# Patient Record
Sex: MALE | Race: WHITE | NOT HISPANIC OR LATINO | Employment: OTHER | URBAN - METROPOLITAN AREA
[De-identification: names, ages, dates, MRNs, and addresses within clinical notes are randomized per-mention and may not be internally consistent; named-entity substitution may affect disease eponyms.]

---

## 2023-10-20 ENCOUNTER — LAB (OUTPATIENT)
Dept: LAB | Facility: LAB | Age: 88
End: 2023-10-20
Payer: MEDICARE

## 2023-10-20 DIAGNOSIS — N28.9 DISORDER OF KIDNEY AND URETER, UNSPECIFIED: Primary | ICD-10-CM

## 2023-10-20 DIAGNOSIS — N28.9 DISORDER OF KIDNEY AND URETER, UNSPECIFIED: ICD-10-CM

## 2023-10-20 LAB
BUN SERPL-MCNC: 26 MG/DL (ref 6–23)
CREAT SERPL-MCNC: 1.65 MG/DL (ref 0.5–1.3)
GFR SERPL CREATININE-BSD FRML MDRD: 39 ML/MIN/1.73M*2

## 2023-10-20 PROCEDURE — 82565 ASSAY OF CREATININE: CPT

## 2023-10-20 PROCEDURE — 84520 ASSAY OF UREA NITROGEN: CPT

## 2023-10-20 PROCEDURE — 36415 COLL VENOUS BLD VENIPUNCTURE: CPT

## 2023-11-10 PROBLEM — I38 VALVULAR HEART DISEASE: Status: ACTIVE | Noted: 2023-11-10

## 2023-11-10 PROBLEM — N45.3 EPIDIDYMOORCHITIS: Status: ACTIVE | Noted: 2023-11-10

## 2023-11-10 PROBLEM — I10 ESSENTIAL HYPERTENSION: Status: ACTIVE | Noted: 2023-11-10

## 2023-11-10 PROBLEM — H61.21 EXCESSIVE CERUMEN IN RIGHT EAR CANAL: Status: ACTIVE | Noted: 2023-11-10

## 2023-11-10 PROBLEM — N40.0 BPH (BENIGN PROSTATIC HYPERPLASIA): Status: ACTIVE | Noted: 2023-11-10

## 2023-11-10 PROBLEM — B00.9 HERPES SIMPLEX VIRUS INFECTION: Status: ACTIVE | Noted: 2023-11-10

## 2023-11-10 PROBLEM — H35.30 MACULAR DEGENERATION: Status: ACTIVE | Noted: 2023-11-10

## 2023-11-10 PROBLEM — I36.1 NONRHEUMATIC TRICUSPID VALVE REGURGITATION: Status: ACTIVE | Noted: 2021-03-10

## 2023-11-10 PROBLEM — I34.0 NONRHEUMATIC MITRAL VALVE REGURGITATION: Status: ACTIVE | Noted: 2021-03-10

## 2023-11-10 PROBLEM — N50.3 EPIDIDYMAL CYST: Status: ACTIVE | Noted: 2023-11-10

## 2023-11-10 PROBLEM — R73.01 IMPAIRED FASTING GLUCOSE: Status: ACTIVE | Noted: 2018-11-21

## 2023-11-10 PROBLEM — R51.9 CHRONIC DAILY HEADACHE: Status: ACTIVE | Noted: 2023-11-10

## 2023-11-10 PROBLEM — J31.0 CHRONIC RHINITIS: Status: ACTIVE | Noted: 2023-11-10

## 2023-11-10 PROBLEM — G47.33 OSA (OBSTRUCTIVE SLEEP APNEA): Status: ACTIVE | Noted: 2023-11-10

## 2023-11-10 PROBLEM — I61.9: Status: ACTIVE | Noted: 2023-11-10

## 2023-11-10 PROBLEM — I11.9 HYPERTENSIVE LEFT VENTRICULAR HYPERTROPHY: Status: ACTIVE | Noted: 2023-11-10

## 2023-11-10 PROBLEM — N52.9 ERECTILE DYSFUNCTION: Status: ACTIVE | Noted: 2023-11-10

## 2023-11-10 PROBLEM — R97.20 ELEVATED PSA: Status: ACTIVE | Noted: 2023-11-10

## 2023-11-10 PROBLEM — R91.8 MULTIPLE PULMONARY NODULES DETERMINED BY COMPUTED TOMOGRAPHY OF LUNG: Status: ACTIVE | Noted: 2021-04-24

## 2023-11-10 PROBLEM — I87.8 VENOUS STASIS OF BOTH LOWER EXTREMITIES: Status: ACTIVE | Noted: 2019-05-31

## 2023-11-10 PROBLEM — J32.9 CHRONIC SINUSITIS: Status: ACTIVE | Noted: 2023-11-10

## 2023-11-10 PROBLEM — H90.3 ASYMMETRICAL SENSORINEURAL HEARING LOSS: Status: ACTIVE | Noted: 2023-11-10

## 2023-11-10 PROBLEM — I71.20 THORACIC AORTIC ANEURYSM WITHOUT RUPTURE (CMS-HCC): Status: ACTIVE | Noted: 2021-03-10

## 2023-11-10 PROBLEM — Z86.718 HISTORY OF DVT OF LOWER EXTREMITY: Status: ACTIVE | Noted: 2017-12-05

## 2023-11-10 PROBLEM — E78.5 HYPERLIPIDEMIA: Status: ACTIVE | Noted: 2023-11-10

## 2023-11-10 RX ORDER — HYDROCHLOROTHIAZIDE 25 MG/1
25 TABLET ORAL DAILY
COMMUNITY

## 2023-11-10 RX ORDER — MAG HYDROX/ALUMINUM HYD/SIMETH 400-400-40
450 SUSPENSION, ORAL (FINAL DOSE FORM) ORAL
COMMUNITY

## 2023-11-10 RX ORDER — SILDENAFIL 100 MG/1
100 TABLET, FILM COATED ORAL
COMMUNITY
Start: 2017-06-06 | End: 2023-11-13

## 2023-11-10 RX ORDER — POTASSIUM CHLORIDE 750 MG/1
10 TABLET, FILM COATED, EXTENDED RELEASE ORAL DAILY
COMMUNITY
Start: 2021-07-21

## 2023-11-10 RX ORDER — RAMIPRIL 10 MG/1
10 CAPSULE ORAL DAILY
COMMUNITY

## 2023-11-10 RX ORDER — DONEPEZIL HYDROCHLORIDE 10 MG/1
10 TABLET, FILM COATED ORAL NIGHTLY
COMMUNITY
Start: 2023-03-16 | End: 2023-11-13

## 2023-11-10 RX ORDER — LIDOCAINE 50 MG/G
1 PATCH TOPICAL DAILY
COMMUNITY
End: 2023-11-13

## 2023-11-10 RX ORDER — ATORVASTATIN CALCIUM 20 MG/1
20 TABLET, FILM COATED ORAL DAILY
COMMUNITY
Start: 2021-07-21

## 2023-11-10 RX ORDER — TADALAFIL 20 MG/1
TABLET ORAL
COMMUNITY
Start: 2021-09-27 | End: 2023-11-13

## 2023-11-10 RX ORDER — FLUOCINONIDE 0.5 MG/G
OINTMENT TOPICAL
COMMUNITY
Start: 2023-07-31 | End: 2023-11-13

## 2023-11-10 RX ORDER — AMLODIPINE BESYLATE 5 MG/1
5 TABLET ORAL DAILY
COMMUNITY
Start: 2023-08-16 | End: 2023-11-13

## 2023-11-10 RX ORDER — MELOXICAM 15 MG/1
1 TABLET ORAL DAILY
COMMUNITY
Start: 2017-03-02 | End: 2023-11-13

## 2023-11-10 RX ORDER — TAMSULOSIN HYDROCHLORIDE 0.4 MG/1
0.4 CAPSULE ORAL DAILY
COMMUNITY
Start: 2022-10-28 | End: 2024-05-16

## 2023-11-10 RX ORDER — TADALAFIL 10 MG/1
10 TABLET ORAL DAILY PRN
COMMUNITY
Start: 2022-09-01 | End: 2023-11-13

## 2023-11-10 RX ORDER — VITAMIN B COMPLEX
TABLET ORAL
COMMUNITY
End: 2023-11-13

## 2023-11-10 RX ORDER — FUROSEMIDE 40 MG/1
40 TABLET ORAL EVERY OTHER DAY
COMMUNITY
Start: 2023-08-16

## 2023-11-10 RX ORDER — AMLODIPINE BESYLATE 10 MG/1
5 TABLET ORAL DAILY
COMMUNITY
End: 2023-11-13

## 2023-11-10 RX ORDER — ASPIRIN 81 MG/1
81 TABLET ORAL
COMMUNITY

## 2023-11-10 RX ORDER — ICOSAPENT ETHYL 1000 MG/1
2 CAPSULE ORAL 2 TIMES DAILY
COMMUNITY
Start: 2023-08-25

## 2023-11-10 RX ORDER — DONEPEZIL HYDROCHLORIDE 5 MG/1
1 TABLET, FILM COATED ORAL 2 TIMES DAILY
COMMUNITY
Start: 2020-05-28 | End: 2023-11-13

## 2023-11-10 RX ORDER — MEMANTINE HYDROCHLORIDE 28 MG/1
28 CAPSULE, EXTENDED RELEASE ORAL DAILY
COMMUNITY

## 2023-11-10 RX ORDER — CALCIUM CARBONATE 300MG(750)
1 TABLET,CHEWABLE ORAL DAILY
COMMUNITY

## 2023-11-10 RX ORDER — POTASSIUM CHLORIDE 1500 MG/1
20 TABLET, EXTENDED RELEASE ORAL 2 TIMES DAILY
COMMUNITY
Start: 2023-08-23 | End: 2023-11-13

## 2023-11-13 ENCOUNTER — OFFICE VISIT (OUTPATIENT)
Dept: UROLOGY | Facility: CLINIC | Age: 88
End: 2023-11-13
Payer: MEDICARE

## 2023-11-13 DIAGNOSIS — N28.1 RENAL CYST: ICD-10-CM

## 2023-11-13 DIAGNOSIS — N40.0 BENIGN PROSTATIC HYPERPLASIA, UNSPECIFIED WHETHER LOWER URINARY TRACT SYMPTOMS PRESENT: ICD-10-CM

## 2023-11-13 DIAGNOSIS — R97.20 ELEVATED PSA: Primary | ICD-10-CM

## 2023-11-13 LAB
POC BILIRUBIN, URINE: NEGATIVE
POC BLOOD, URINE: NEGATIVE
POC GLUCOSE, URINE: NEGATIVE MG/DL
POC KETONES, URINE: NEGATIVE MG/DL
POC LEUKOCYTES, URINE: NEGATIVE
POC NITRITE,URINE: NEGATIVE
POC PH, URINE: 7 PH
POC PROTEIN, URINE: NEGATIVE MG/DL
POC SPECIFIC GRAVITY, URINE: 1.01
POC UROBILINOGEN, URINE: 0.2 EU/DL

## 2023-11-13 PROCEDURE — 99213 OFFICE O/P EST LOW 20 MIN: CPT | Performed by: UROLOGY

## 2023-11-13 PROCEDURE — 3075F SYST BP GE 130 - 139MM HG: CPT | Performed by: UROLOGY

## 2023-11-13 PROCEDURE — 81003 URINALYSIS AUTO W/O SCOPE: CPT | Performed by: UROLOGY

## 2023-11-13 PROCEDURE — 1159F MED LIST DOCD IN RCRD: CPT | Performed by: UROLOGY

## 2023-11-13 PROCEDURE — 3080F DIAST BP >= 90 MM HG: CPT | Performed by: UROLOGY

## 2023-11-13 NOTE — PROGRESS NOTES
11/13/2023   voiding well, no complain.    Patient has no nausea, no vomiting, no fever.    PAVEL: No more    PSA: No more    We discussed left scrotal enlargement, scrotal ultrasound  We discussed CT scan results, unremarkable   all the questions were answered, the patient expressed understanding and agreed to the plan.     Impression  Left scrotal enlargement  Left ureteral lesion  Renal cyst  Elevated PSA stable  BPH  ED     Plan  Renal ultrasound in 1 year for renal cyst, ureteral lesion follow-up  No more PSA check  Appointment in 1 year    Chief Complaint   Patient presents with    Benign Prostatic Hypertrophy     Voiding well.     Groin Swelling    Left Ureteral Lesion        Physical Exam     TODAYS LAB RESULTS:    No Scrotal US in records    CT Abdomen and Pelvis 08/29/2023  IMPRESSION:     1. No acute fracture or traumatic malalignment of the thoracolumbar   spine.   2. No pleural effusion, consolidation or acute findings in the abdomen   or pelvis.     3.  New 1.3 cm area of confluent nodularities in the superior left lower   lobe, might represent focal inflammation however follow-up in one to 3   months is recommended to assess for resolution.  Other lung nodules are   stable.     4. Ascending stable fusiform dilation of the ascending aorta measuring   up to 4.4 cm with ectatic descending aorta as noted on 2021.   5.  Significantly enlarged prostate.  Bladder wall trabeculation and mild   bilateral urothelial thickening, reflecting associated chronic   obstructive changes.   Other chronic findings, as above.     POC Glucose, Urine  NEGATIVE mg/dl NEGATIVE   POC Bilirubin, Urine  NEGATIVE NEGATIVE   POC Ketones, Urine  NEGATIVE mg/dl NEGATIVE   POC Specific Gravity, Urine  1.005 - 1.035 1.015   POC Blood, Urine  NEGATIVE NEGATIVE   POC PH, Urine  No Reference Range Established PH 7.0   POC Protein, Urine  NEGATIVE, 30 (1+) mg/dl NEGATIVE   POC Urobilinogen, Urine  0.2, 1.0 EU/DL 0.2   Poc Nitrite,  Urine  NEGATIVE NEGATIVE   POC Leukocytes, Urine  NEGATIVE NEGATIVE       ASSESSMENT&PLAN:      IMPRESSIONS:     06/05/2023  Complaining left scrotal enlargement, no pain     Patient has no nausea, no vomiting, no fever.     Exam: Left scrotum twice and large, soft no tenderness     Patient here today for concerns of swollen testicle. He was last seen 5/11/23 for left ureteral lesion, elevated PSA, BPH. He was to follow up in 6 months with BUN/Cr, CT abd/pelvis prior.      Patient has noticed some swelling in left testicle ongoing for a few years, but over the past few weeks it has gotten bigger. He fell on 5/3/23 and since then he noticed some swelling in leg as well as left testicle. He is not having any pain or discomfort at all.      -JMorgenstern CMA        IO Glucose - Urine Negative REQUIRED    IO Bilirubin Negative REQUIRED    IO Ketones Negative REQUIRED    IO Specific Gravity 1.015 REQUIRED    IO Blood Negative REQUIRED    IO pH 7.0 REQUIRED    IO Protein, Urine Negative REQUIRED    IO Urobilinogen Normal (0.2-1.0 mg/dl) REQUIRED    IO Nitrite, Urine Negative REQUIRED    IO Leukocytes Negative      We discussed left scrotal enlargement, scrotal ultrasound  We discussed continue CT scan in the follow-up appointment  All the questions were answered, the patient expressed understanding and agreed to the plan.     Impression  Left scrotal enlargement  Left ureteral lesion  Elevated PSAâ€“stable  BPH  ED     Plan  Scrotal ultrasound for left scrotal enlargement  Keep current CT scan and appointment     I spent 25 minutes with this patient. Greater than 50% of this time was spent in counseling and/or coordination of care        05/11/2023  Patient here for incidental finding of left ureteral lesion on the CT scan outside hospital     Patient has no nausea, no vomiting, no fever.     Patient here today for consult per Dr. Gonsales for concerns of mass on ureter noted on CT done 5/3/23- printed for review. Patient  had CT done while inpatient at [facility] due to syncope, bradycardia, edema of right leg.      CT abdomen and pelvis: Questionable soft tissue density along the left ureter. Cannot excludeed urothelial cancer.     Patient was last seen in office 11/10/22 for elevated PSA, BPH. He was to continue the Flomax 0.4mg daily- he is take one daily and will need a refill of this. Patient denies dysuria, burning, hematuria, frequency/urgency. Nocturia x1. Overall states he is doing well with no urinary issues.   PSA 3/18/22 7.84  -JMorgenstern CMA     IO Glucose - Urine Negative REQUIRED    IO Bilirubin Negative REQUIRED    IO Ketones Negative REQUIRED    IO Specific Gravity 1.010 REQUIRED    IO Blood Trace REQUIRED    IO pH 7.0 REQUIRED    IO Protein, Urine Negative REQUIRED    IO Urobilinogen Normal (0.2-1.0 mg/dl) REQUIRED    IO Nitrite, Urine Negative REQUIRED    IO Leukocytes Negative      We discussed the CT report, left ureteral lesion, repeat imaging study in 6 months  All the questions were answered, the patient expressed understanding and agreed to the plan.     Impression  Left ureteral lesion  Elevated PSAâ€“stable  BPH  ED     Plan  CT abdomen and pelvis with and without contrast in 6 months for left ureteral lesion follow-up  BUN/creatinine in 6 months  Continue Flomax 0.4 mg daily  Appointment in 6 months     I spent 25 minutes with this patient. Greater than 50% of this time was spent in counseling and/or coordination of care        11/10/2022  Not tolerate Flomax 2 a day, switch to 0.4 mg daily     Patient has no nausea, no vomiting, no fever.     PAVEL: Deferred     PSA stable 7.84     Patient here for 3 month follow up for elevated PSA, ED, BPH  Last PSA done 03/18/22 7.84  Patient see  last was prescribed tadalafil --Mara  Patient denied urgency, nocturia, dysuria, hematuria. He does have some frequency. He does feel that he empties all the way. He went to ER from passing out and the ER said  it was from the flomax. He states that his blood pressure was really low. He is now taking one a a day. He does not think he is taking tadalafil.     We discussed left scrotal enlargement, conservative management   We discussed benign prostate hypertrophy with a mild voiding symptoms on Flomax  We discussed erectile dysfunction, failed medications, penile injection and prosthesis etc, Cialis 5mg trial   We discussed elevated PSA but stable  All the questions were answered, the patient expressed understanding and agreed to the plan.     Impression  Elevated PSAâ€“stable  BPH  ED     Plan  Continue Flomax 0.4 mg daily  Yearly PAVEL and PSA        9/1/2022 HBM  Impression: Erectile dysfunction     History of elevated PSA with negative transrectal ultrasound and biopsy in the past     Plan: Try Cialis the risks benefits alternatives and complications of this drug was described to the patient in detail     A prescription for Cialis 10 mg was called into his local pharmacy he is to call us within the next few weeks if this drug is successful we will call in a long-term prescription for him     Repeat PSA in April 2023     Return to the office in 3 months for follow-up.            9/27/21:   Patient here today for year check of BPH, elevated PSA, ED. Patient states he noticed a lump in left testicle that seems to be getting larger. He is also still concerned about ED.      Nocturia 2-3x.      Patient takes Flomax 0.8mg daily.     Exam: Not circumcised, penis normal, left side testicle 2x bigger     PSA 5/12/21 8.62 - Slight decrease      IO UA (automated w/o microscopy)           [id number] 03:41PM          Gabriel Del Rio     Test Name       Result     Flag        Reference  IO Glucose - Urine         Negative                  IO Bilirubin       Negative                  IO Ketones      Trace                       IO Specific Gravity         1.030                       IO Blood          Negative                  IO pH                5.0                           IO Protein, Urine            Negative                  IO Urobilinogen                                              Normal (0.2-1.0 mg/dl)  IO Nitrite, Urine              Negative                  IO Leukocytes Negative                  IO Glucose - Urine         Negative                  IO Bilirubin       Negative                  IO Ketones      Trace                       IO Specific Gravity         1.030                       IO Blood          Negative                  IO pH               5.0                           IO Protein, Urine            Negative                  IO Urobilinogen                                              Normal (0.2-1.0 mg/dl)  IO Nitrite, Urine              Negative                  IO Leukocytes Negative                                                            We discussed left scrotal enlargement, conservative management   We discussed benign prostate hypertrophy with a mild voiding symptoms on Flomax  We discussed erectile dysfunction, failed medications, penile injection and prosthesis etc, Cialis 5mg trial   We discussed elevated PSA but stable  All the questions were answered, the patient expressed understanding and agreed to the plan.     Impression  Elevated PSAâ€“stable  BPH  ED     Plan  Cialis 5mg as needed x5, trial 2x refill  Continue Flomax 0.8 mg daily  Yearly PAVEL and PSA     I spent 25 minutes with the patient. Greater than 50% of this time was spent in counselling and/or coordination of care.      11/20/2020  Voiding well on Flomax 0.4 mg daily, no weight loss no bone pain     Patient has no nausea, no vomiting, no fever.     PAVEL: Deferred     PSA 5/12/20 9.50.â€“Stable     IO UA (automated w/o microscopy)           [id number] 09:09AM          Gabriel Del Rio     Test Name       Result     Flag        Reference  IO Glucose - Urine         Negative                  IO Bilirubin       Negative                  IO Ketones      Negative                   IO Specific Gravity         1.025                       IO Blood          Negative                  IO pH               6.0                           IO Protein, Urine            Negative                  IO Urobilinogen                                              Normal (0.2-1.0 mg/dl)  IO Nitrite, Urine              Negative                  IO Leukocytes Negative                  IO Glucose - Urine         Negative                  IO Bilirubin       Negative                  IO Ketones      Negative                  IO Specific Gravity         1.025                       IO Blood          Negative                  IO pH               6.0                           IO Protein, Urine            Negative                  IO Urobilinogen                                              Normal (0.2-1.0 mg/dl)  IO Nitrite, Urine              Negative                  IO Leukocytes Negative                                                            We discussed benign prostate hypertrophy with a mild voiding symptoms on Flomax  We discussed erectile dysfunction, failed medications, penile injection and prosthesis etc.  We discussed elevated PSA but stable  All the questions were answered, the patient expressed understanding and agreed to the plan.     Impression  Elevated PSAâ€“stable  BPH  ED     Plan  Continue Flomax 0.4 mg daily  Yearly PAVEL and PSA              11/15/2019  Voiding well, nocturia Ã--1, on Flomax.; Complaining ED, Viagra does not work      Patient has no nausea, no vomiting, no fever.     PAVEL: 1+, no nodule     PSA 5/6/19 8.69     Test Name       Result     Flag        Reference  IO Glucose - Urine         Negative                Normal  IO Bilirubin       Negative                Negative  IO Ketones      Negative                Negative  IO Specific Gravity         1.015                     1.000-1.030  IO Blood          Negative                Negative  IO pH               6.5                          5.0-8.0  IO Protein, Urine            Negative                Negative  IO Urobilinogen                                            Normal  Normal (0.2-1.0 mg/dl)  IO Nitrite, Urine              Negative                Negative  IO Leukocytes Negative                Negative  IO Glucose - Urine         Negative                Normal  IO Bilirubin       Negative                Negative  IO Ketones      Negative                Negative  IO Specific Gravity         1.015                     1.000-1.030  IO Blood          Negative                Negative  IO pH               6.5                         5.0-8.0  IO Protein, Urine            Negative                Negative  IO Urobilinogen                                            Normal  Normal (0.2-1.0 mg/dl)  IO Nitrite, Urine              Negative                Negative  IO Leukocytes Negative                Negative     We discussed benign prostate hypertrophy with a mild voiding symptoms on Flomax  We discussed erectile dysfunction, failed medications, penile injection and prosthesis etc.  We discussed elevated PSA but stable  All the questions were answered, the patient expressed understanding and agreed to the plan.     Impression  Elevated PSAâ€“stable  BPH  ED     Plan  Continue Flomax 0.4 mg daily  Yearly PAVEL and a PSA           11/15/18 Kati Monet CNP      Patient here for year check. Has a history of elevated PSA. Last PSA was 7.9 from May 2017. History of multiple negative TRUS prostate biopsies. Patient has no complaints today. Nocturia x 1. Does not bother. Denies weak stream, hesitancy, intermittent stream, hematuria, dysuria and incontinence. He states he has not had any discomfort in his scrotum. PAVEL 2+ Prostate is smooth, symmetric, with no nodules or induration. Patient has large nodule in scrotum that is tender to palpation. Nodule is displacing testicle. He has a known cluster of cysts. Will order repeat PSA due to history of elevated PSA  and and repeat scrotal US All the questions were answered, the patient expressed understanding and agreed to the plan.     IO Glucose - Urine         Negative                Normal  IO Bilirubin       Negative                Negative  IO Ketones      Negative                Negative  IO Specific Gravity         1.015                     1.000-1.030  IO Blood          Negative                Negative  IO pH               7.0                         5.0-8.0  IO Protein, Urine            Negative                Negative  IO Urobilinogen                                            Normal  Normal (0.2-1.0 mg/dl)  IO Nitrite, Urine              Negative                Negative  IO Leukocytes Negative                Negative  IO Glucose - Urine         Negative                Normal  IO Bilirubin       Negative                Negative  IO Ketones      Negative                Negative  IO Specific Gravity         1.015                     1.000-1.030  IO Blood          Negative                Negative  IO pH               7.0                         5.0-8.0  IO Protein, Urine            Negative                Negative  IO Urobilinogen                                            Normal  Normal (0.2-1.0 mg/dl)  IO Nitrite, Urine              Negative                Negative  IO Leukocytes Negative                Negative     Scrotal US  11/13/17  IMPRESSION:  Complex right epididymal head cyst is slightly smaller compared to 2013.  Large cluster of epididymal head cysts on the left have enlarged since 2013.  Slight interval enlargement of left intratesticular cyst.  Bilateral tubular ectasia of rete testes.     Impression   BPH   Elevated PSA   Large cluster of left epididymal cysts  RIght complex epididymal cyst     Plan   Repeat PSA  Scrotal US  Follow up in one year PAVEL      I spent 25 minutes with this patient. Greater than 50% of this time was spent in counseling and/or coordination of care        Plan   11/27/17  Normal  scrotal pain after antibiotics     Patient has no nausea, no vomiting, no fever.     Scrotal ultrasound bilateral epididymal cysts     We discussed scrotal pain, scrotal mass and scrotal ultrasound result.We discussed patient's current condition and treatment plan. All questions were answered, patient expressed understanding and agreed to the plan.     Plan  Yearly PAVEL and appointment        11/8/17  Incidental several finding left scrotal mass, also complains some tenderness.     No voiding problems, no burning no hematuria no nausea no vomiting no fever     Exam 3 cm nodule behind left testicle with mild tenderness, no erythematous     We discussed scrotal mass, possible hydrocele, spermatocele epididymoorchitis etc. empirical antibiotics regimen and a scrotal ultrasound study. All questions answered and the patient expressed understanding and agreed to the plan     Plan  Cipro 500 mg twice a day for 1 week refill Ã--1  Scrotal ultrasound in a week for left scrotal mass  Appointment in 2 weeks     5/12/17  Patient voiding well, nocturia Ã--1 on Flomax 0.8 mg daily     PVR 1 cc     PSA 7.9     We discussed benign prostate hypertrophy, elevated PSA and negative prostate biopsy multiple times. Decision was made to continue follow-up yearly PSA. Patient expressed understanding and agreed to the plan     Plan  Yearly PAVEL and PSA  Continue Flomax 0.8 mg daily           5/11/16 large prostate  Voiding well on Flomax  PSA 9 but stable  PAVEL 2+  Plan  PSA in 6 months and a year  Appointment in a year for uroflow and PVR     5/4/15 large prostate  Voiding better on Flomax Ã--2  PSA down to 8.5  Plan  Continue Flomax  PSA in 3 months, in the year  Appointment in a year     PSA  03/18/22 7.84  5/12/21 8.62  5/12/20 9.50.  5/6/19 8.69  5/5/17 7.9  5/4/16 9.0  4/30/15 8.5  2/16/1516.6  ...  11/13/06 4.9     Surgery  12/18/07 prostate biopsy negative  2/9/6 TUMT  1/5/06 prostate biopsy negative  12/23/04 prostate

## 2024-11-14 ENCOUNTER — APPOINTMENT (OUTPATIENT)
Dept: UROLOGY | Facility: CLINIC | Age: 89
End: 2024-11-14
Payer: MEDICARE

## 2024-11-14 VITALS
HEIGHT: 69 IN | SYSTOLIC BLOOD PRESSURE: 142 MMHG | WEIGHT: 190 LBS | DIASTOLIC BLOOD PRESSURE: 87 MMHG | HEART RATE: 91 BPM | BODY MASS INDEX: 28.14 KG/M2

## 2024-11-14 DIAGNOSIS — R97.20 ELEVATED PSA: ICD-10-CM

## 2024-11-14 LAB
POC BILIRUBIN, URINE: NEGATIVE
POC BLOOD, URINE: NEGATIVE
POC GLUCOSE, URINE: NEGATIVE MG/DL
POC KETONES, URINE: NEGATIVE MG/DL
POC LEUKOCYTES, URINE: NEGATIVE
POC NITRITE,URINE: NEGATIVE
POC PH, URINE: 6.5 PH
POC PROTEIN, URINE: NEGATIVE MG/DL
POC SPECIFIC GRAVITY, URINE: 1.01
POC UROBILINOGEN, URINE: 0.2 EU/DL

## 2024-11-14 PROCEDURE — 3079F DIAST BP 80-89 MM HG: CPT | Performed by: UROLOGY

## 2024-11-14 PROCEDURE — 99213 OFFICE O/P EST LOW 20 MIN: CPT | Performed by: UROLOGY

## 2024-11-14 PROCEDURE — 81003 URINALYSIS AUTO W/O SCOPE: CPT | Performed by: UROLOGY

## 2024-11-14 PROCEDURE — 1159F MED LIST DOCD IN RCRD: CPT | Performed by: UROLOGY

## 2024-11-14 PROCEDURE — 3077F SYST BP >= 140 MM HG: CPT | Performed by: UROLOGY

## 2024-11-14 NOTE — PROGRESS NOTES
11/14/2024  Voiding well on Flomax 0.4 mg daily    Patient has no nausea, no vomiting, no fever.    Did not get a renal ultrasound    We discussed left scrotal enlargement, scrotal ultrasound  We discussed benign prostate hypertrophy continue Flomax 0.4 mg daily  We discussed send no more PSA due to the age  all the questions were answered, the patient expressed understanding and agreed to the plan.     Impression  Left scrotal enlargement  Left ureteral lesion  Renal cyst  Elevated PSA stable  BPH  ED     Plan  Yearly follow-up with PCP  No more PSA check  Call if problem    Chief Complaint   Patient presents with    Elevated PSA     Patient is here today for year follow uip on Left scrotal enlargement  Left ureteral lesion  Renal cyst  Elevated PSA stable  BPH  ED  He denies any voiding issues at this ntime.  He did fall this am coming in to the building he said he wasn't paying attention and lost his footing.        Physical Exam     TODAYS LAB RESULTS:  Pt did not do ultrasound of kidneys.        Glucose, Urine  NEGATIVE mg/dl NEGATIVE NEGATIVE   POC Bilirubin, Urine  NEGATIVE NEGATIVE NEGATIVE   POC Ketones, Urine  NEGATIVE mg/dl NEGATIVE NEGATIVE   POC Specific Gravity, Urine  1.005 - 1.035 1.015 1.015   POC Blood, Urine  NEGATIVE NEGATIVE NEGATIVE   POC PH, Urine  No Reference Range Established PH 6.5 7.0   POC Protein, Urine  NEGATIVE, 30 (1+) mg/dl NEGATIVE NEGATIVE   POC Urobilinogen, Urine  0.2, 1.0 EU/DL 0.2 0.2   Poc Nitrite, Urine  NEGATIVE NEGATIVE NEGATIVE   POC Leukocytes, Urine  NEGATIVE NEGATIVE NEGATIVE     ASSESSMENT&PLAN:      IMPRESSIONS:  11/13/2023   voiding well, no complain.     Patient has no nausea, no vomiting, no fever.     PAVEL: No more     PSA: No more     We discussed left scrotal enlargement, scrotal ultrasound  We discussed CT scan results, unremarkable   all the questions were answered, the patient expressed understanding and agreed to the plan.     Impression  Left scrotal  enlargement  Left ureteral lesion  Renal cyst  Elevated PSA stable  BPH  ED     Plan  Renal ultrasound in 1 year for renal cyst, ureteral lesion follow-up  No more PSA check  Appointment in 1 year          Chief Complaint   Patient presents with    Benign Prostatic Hypertrophy       Voiding well.     Groin Swelling    Left Ureteral Lesion         Physical Exam      TODAYS LAB RESULTS:     No Scrotal US in records     CT Abdomen and Pelvis 08/29/2023  IMPRESSION:     1. No acute fracture or traumatic malalignment of the thoracolumbar   spine.   2. No pleural effusion, consolidation or acute findings in the abdomen   or pelvis.     3.  New 1.3 cm area of confluent nodularities in the superior left lower   lobe, might represent focal inflammation however follow-up in one to 3   months is recommended to assess for resolution.  Other lung nodules are   stable.     4. Ascending stable fusiform dilation of the ascending aorta measuring   up to 4.4 cm with ectatic descending aorta as noted on 2021.   5.  Significantly enlarged prostate.  Bladder wall trabeculation and mild   bilateral urothelial thickening, reflecting associated chronic   obstructive changes.   Other chronic findings, as above.      POC Glucose, Urine  NEGATIVE mg/dl NEGATIVE   POC Bilirubin, Urine  NEGATIVE NEGATIVE   POC Ketones, Urine  NEGATIVE mg/dl NEGATIVE   POC Specific Gravity, Urine  1.005 - 1.035 1.015   POC Blood, Urine  NEGATIVE NEGATIVE   POC PH, Urine  No Reference Range Established PH 7.0   POC Protein, Urine  NEGATIVE, 30 (1+) mg/dl NEGATIVE   POC Urobilinogen, Urine  0.2, 1.0 EU/DL 0.2   Poc Nitrite, Urine  NEGATIVE NEGATIVE   POC Leukocytes, Urine  NEGATIVE NEGATIVE         ASSESSMENT&PLAN:        IMPRESSIONS:      06/05/2023  Complaining left scrotal enlargement, no pain     Patient has no nausea, no vomiting, no fever.     Exam: Left scrotum twice and large, soft no tenderness     Patient here today for concerns of swollen testicle. He  was last seen 5/11/23 for left ureteral lesion, elevated PSA, BPH. He was to follow up in 6 months with BUN/Cr, CT abd/pelvis prior.      Patient has noticed some swelling in left testicle ongoing for a few years, but over the past few weeks it has gotten bigger. He fell on 5/3/23 and since then he noticed some swelling in leg as well as left testicle. He is not having any pain or discomfort at all.      -JMorgenstern CMA        IO Glucose - Urine Negative REQUIRED    IO Bilirubin Negative REQUIRED    IO Ketones Negative REQUIRED    IO Specific Gravity 1.015 REQUIRED    IO Blood Negative REQUIRED    IO pH 7.0 REQUIRED    IO Protein, Urine Negative REQUIRED    IO Urobilinogen Normal (0.2-1.0 mg/dl) REQUIRED    IO Nitrite, Urine Negative REQUIRED    IO Leukocytes Negative      We discussed left scrotal enlargement, scrotal ultrasound  We discussed continue CT scan in the follow-up appointment  All the questions were answered, the patient expressed understanding and agreed to the plan.     Impression  Left scrotal enlargement  Left ureteral lesion  Elevated PSAâ€“stable  BPH  ED     Plan  Scrotal ultrasound for left scrotal enlargement  Keep current CT scan and appointment     I spent 25 minutes with this patient. Greater than 50% of this time was spent in counseling and/or coordination of care        05/11/2023  Patient here for incidental finding of left ureteral lesion on the CT scan outside hospital     Patient has no nausea, no vomiting, no fever.     Patient here today for consult per Dr. Gonsales for concerns of mass on ureter noted on CT done 5/3/23- printed for review. Patient had CT done while inpatient at [facility] due to syncope, bradycardia, edema of right leg.      CT abdomen and pelvis: Questionable soft tissue density along the left ureter. Cannot excludeed urothelial cancer.     Patient was last seen in office 11/10/22 for elevated PSA, BPH. He was to continue the Flomax 0.4mg daily- he is take one  daily and will need a refill of this. Patient denies dysuria, burning, hematuria, frequency/urgency. Nocturia x1. Overall states he is doing well with no urinary issues.   PSA 3/18/22 7.84  -JMorgenstern CMA     IO Glucose - Urine Negative REQUIRED    IO Bilirubin Negative REQUIRED    IO Ketones Negative REQUIRED    IO Specific Gravity 1.010 REQUIRED    IO Blood Trace REQUIRED    IO pH 7.0 REQUIRED    IO Protein, Urine Negative REQUIRED    IO Urobilinogen Normal (0.2-1.0 mg/dl) REQUIRED    IO Nitrite, Urine Negative REQUIRED    IO Leukocytes Negative      We discussed the CT report, left ureteral lesion, repeat imaging study in 6 months  All the questions were answered, the patient expressed understanding and agreed to the plan.     Impression  Left ureteral lesion  Elevated PSAâ€“stable  BPH  ED     Plan  CT abdomen and pelvis with and without contrast in 6 months for left ureteral lesion follow-up  BUN/creatinine in 6 months  Continue Flomax 0.4 mg daily  Appointment in 6 months     I spent 25 minutes with this patient. Greater than 50% of this time was spent in counseling and/or coordination of care        11/10/2022  Not tolerate Flomax 2 a day, switch to 0.4 mg daily     Patient has no nausea, no vomiting, no fever.     PAVEL: Deferred     PSA stable 7.84     Patient here for 3 month follow up for elevated PSA, ED, BPH  Last PSA done 03/18/22 7.84  Patient see  last was prescribed tadalafil --Mara  Patient denied urgency, nocturia, dysuria, hematuria. He does have some frequency. He does feel that he empties all the way. He went to ER from passing out and the ER said it was from the flomax. He states that his blood pressure was really low. He is now taking one a a day. He does not think he is taking tadalafil.     We discussed left scrotal enlargement, conservative management   We discussed benign prostate hypertrophy with a mild voiding symptoms on Flomax  We discussed erectile dysfunction, failed  medications, penile injection and prosthesis etc, Cialis 5mg trial   We discussed elevated PSA but stable  All the questions were answered, the patient expressed understanding and agreed to the plan.     Impression  Elevated PSAâ€“stable  BPH  ED     Plan  Continue Flomax 0.4 mg daily  Yearly PAVEL and PSA        9/1/2022 HBM  Impression: Erectile dysfunction     History of elevated PSA with negative transrectal ultrasound and biopsy in the past     Plan: Try Cialis the risks benefits alternatives and complications of this drug was described to the patient in detail     A prescription for Cialis 10 mg was called into his local pharmacy he is to call us within the next few weeks if this drug is successful we will call in a long-term prescription for him     Repeat PSA in April 2023     Return to the office in 3 months for follow-up.            9/27/21:   Patient here today for year check of BPH, elevated PSA, ED. Patient states he noticed a lump in left testicle that seems to be getting larger. He is also still concerned about ED.      Nocturia 2-3x.      Patient takes Flomax 0.8mg daily.     Exam: Not circumcised, penis normal, left side testicle 2x bigger     PSA 5/12/21 8.62 - Slight decrease      IO UA (automated w/o microscopy)           [id number] 03:41PM          Gabriel Del Rio     Test Name       Result     Flag        Reference  IO Glucose - Urine         Negative                  IO Bilirubin       Negative                  IO Ketones      Trace                       IO Specific Gravity         1.030                       IO Blood          Negative                  IO pH               5.0                           IO Protein, Urine            Negative                  IO Urobilinogen                                              Normal (0.2-1.0 mg/dl)  IO Nitrite, Urine              Negative                  IO Leukocytes Negative                  IO Glucose - Urine         Negative                  IO Bilirubin        Negative                  IO Ketones      Trace                       IO Specific Gravity         1.030                       IO Blood          Negative                  IO pH               5.0                           IO Protein, Urine            Negative                  IO Urobilinogen                                              Normal (0.2-1.0 mg/dl)  IO Nitrite, Urine              Negative                  IO Leukocytes Negative                                                            We discussed left scrotal enlargement, conservative management   We discussed benign prostate hypertrophy with a mild voiding symptoms on Flomax  We discussed erectile dysfunction, failed medications, penile injection and prosthesis etc, Cialis 5mg trial   We discussed elevated PSA but stable  All the questions were answered, the patient expressed understanding and agreed to the plan.     Impression  Elevated PSAâ€“stable  BPH  ED     Plan  Cialis 5mg as needed x5, trial 2x refill  Continue Flomax 0.8 mg daily  Yearly PAVEL and PSA     I spent 25 minutes with the patient. Greater than 50% of this time was spent in counselling and/or coordination of care.      11/20/2020  Voiding well on Flomax 0.4 mg daily, no weight loss no bone pain     Patient has no nausea, no vomiting, no fever.     PAVEL: Deferred     PSA 5/12/20 9.50.â€“Stable     IO UA (automated w/o microscopy)           [id number] 09:09AM          Gabriel Del Rio     Test Name       Result     Flag        Reference  IO Glucose - Urine         Negative                  IO Bilirubin       Negative                  IO Ketones      Negative                  IO Specific Gravity         1.025                       IO Blood          Negative                  IO pH               6.0                           IO Protein, Urine            Negative                  IO Urobilinogen                                              Normal (0.2-1.0 mg/dl)  IO Nitrite, Urine               Negative                  IO Leukocytes Negative                  IO Glucose - Urine         Negative                  IO Bilirubin       Negative                  IO Ketones      Negative                  IO Specific Gravity         1.025                       IO Blood          Negative                  IO pH               6.0                           IO Protein, Urine            Negative                  IO Urobilinogen                                              Normal (0.2-1.0 mg/dl)  IO Nitrite, Urine              Negative                  IO Leukocytes Negative                                                            We discussed benign prostate hypertrophy with a mild voiding symptoms on Flomax  We discussed erectile dysfunction, failed medications, penile injection and prosthesis etc.  We discussed elevated PSA but stable  All the questions were answered, the patient expressed understanding and agreed to the plan.     Impression  Elevated PSAâ€“stable  BPH  ED     Plan  Continue Flomax 0.4 mg daily  Yearly PAVEL and PSA              11/15/2019  Voiding well, nocturia Ã--1, on Flomax.; Complaining ED, Viagra does not work      Patient has no nausea, no vomiting, no fever.     PAVEL: 1+, no nodule     PSA 5/6/19 8.69     Test Name       Result     Flag        Reference  IO Glucose - Urine         Negative                Normal  IO Bilirubin       Negative                Negative  IO Ketones      Negative                Negative  IO Specific Gravity         1.015                     1.000-1.030  IO Blood          Negative                Negative  IO pH               6.5                         5.0-8.0  IO Protein, Urine            Negative                Negative  IO Urobilinogen                                            Normal  Normal (0.2-1.0 mg/dl)  IO Nitrite, Urine              Negative                Negative  IO Leukocytes Negative                Negative  IO Glucose - Urine         Negative                 Normal  IO Bilirubin       Negative                Negative  IO Ketones      Negative                Negative  IO Specific Gravity         1.015                     1.000-1.030  IO Blood          Negative                Negative  IO pH               6.5                         5.0-8.0  IO Protein, Urine            Negative                Negative  IO Urobilinogen                                            Normal  Normal (0.2-1.0 mg/dl)  IO Nitrite, Urine              Negative                Negative  IO Leukocytes Negative                Negative     We discussed benign prostate hypertrophy with a mild voiding symptoms on Flomax  We discussed erectile dysfunction, failed medications, penile injection and prosthesis etc.  We discussed elevated PSA but stable  All the questions were answered, the patient expressed understanding and agreed to the plan.     Impression  Elevated PSAâ€“stable  BPH  ED     Plan  Continue Flomax 0.4 mg daily  Yearly PAVEL and a PSA           11/15/18 Kati Monet CNP      Patient here for year check. Has a history of elevated PSA. Last PSA was 7.9 from May 2017. History of multiple negative TRUS prostate biopsies. Patient has no complaints today. Nocturia x 1. Does not bother. Denies weak stream, hesitancy, intermittent stream, hematuria, dysuria and incontinence. He states he has not had any discomfort in his scrotum. PAVEL 2+ Prostate is smooth, symmetric, with no nodules or induration. Patient has large nodule in scrotum that is tender to palpation. Nodule is displacing testicle. He has a known cluster of cysts. Will order repeat PSA due to history of elevated PSA and and repeat scrotal US All the questions were answered, the patient expressed understanding and agreed to the plan.     IO Glucose - Urine         Negative                Normal  IO Bilirubin       Negative                Negative  IO Ketones      Negative                Negative  IO Specific Gravity         1.015                      1.000-1.030  IO Blood          Negative                Negative  IO pH               7.0                         5.0-8.0  IO Protein, Urine            Negative                Negative  IO Urobilinogen                                            Normal  Normal (0.2-1.0 mg/dl)  IO Nitrite, Urine              Negative                Negative  IO Leukocytes Negative                Negative  IO Glucose - Urine         Negative                Normal  IO Bilirubin       Negative                Negative  IO Ketones      Negative                Negative  IO Specific Gravity         1.015                     1.000-1.030  IO Blood          Negative                Negative  IO pH               7.0                         5.0-8.0  IO Protein, Urine            Negative                Negative  IO Urobilinogen                                            Normal  Normal (0.2-1.0 mg/dl)  IO Nitrite, Urine              Negative                Negative  IO Leukocytes Negative                Negative     Scrotal US  11/13/17  IMPRESSION:  Complex right epididymal head cyst is slightly smaller compared to 2013.  Large cluster of epididymal head cysts on the left have enlarged since 2013.  Slight interval enlargement of left intratesticular cyst.  Bilateral tubular ectasia of rete testes.     Impression   BPH   Elevated PSA   Large cluster of left epididymal cysts  RIght complex epididymal cyst     Plan   Repeat PSA  Scrotal US  Follow up in one year PAVEL      I spent 25 minutes with this patient. Greater than 50% of this time was spent in counseling and/or coordination of care        Plan   11/27/17  Normal scrotal pain after antibiotics     Patient has no nausea, no vomiting, no fever.     Scrotal ultrasound bilateral epididymal cysts     We discussed scrotal pain, scrotal mass and scrotal ultrasound result.We discussed patient's current condition and treatment plan. All questions were answered, patient expressed understanding and  agreed to the plan.     Plan  Yearly PAVEL and appointment        11/8/17  Incidental several finding left scrotal mass, also complains some tenderness.     No voiding problems, no burning no hematuria no nausea no vomiting no fever     Exam 3 cm nodule behind left testicle with mild tenderness, no erythematous     We discussed scrotal mass, possible hydrocele, spermatocele epididymoorchitis etc. empirical antibiotics regimen and a scrotal ultrasound study. All questions answered and the patient expressed understanding and agreed to the plan     Plan  Cipro 500 mg twice a day for 1 week refill Ã--1  Scrotal ultrasound in a week for left scrotal mass  Appointment in 2 weeks     5/12/17  Patient voiding well, nocturia Ã--1 on Flomax 0.8 mg daily     PVR 1 cc     PSA 7.9     We discussed benign prostate hypertrophy, elevated PSA and negative prostate biopsy multiple times. Decision was made to continue follow-up yearly PSA. Patient expressed understanding and agreed to the plan     Plan  Yearly PAVEL and PSA  Continue Flomax 0.8 mg daily           5/11/16 large prostate  Voiding well on Flomax  PSA 9 but stable  PAVEL 2+  Plan  PSA in 6 months and a year  Appointment in a year for uroflow and PVR     5/4/15 large prostate  Voiding better on Flomax Ã--2  PSA down to 8.5  Plan  Continue Flomax  PSA in 3 months, in the year  Appointment in a year     PSA  03/18/22 7.84  5/12/21 8.62  5/12/20 9.50.  5/6/19 8.69  5/5/17 7.9  5/4/16 9.0  4/30/15 8.5  2/16/1516.6  ...  11/13/06 4.9     Surgery  12/18/07 prostate biopsy negative  2/9/6 TUMT  1/5/06 prostate biopsy negative  12/23/04 prostate

## 2024-11-14 NOTE — PATIENT INSTRUCTIONS
Impression  Left scrotal enlargement  Left ureteral lesion  Renal cyst  Elevated PSA stable  BPH  ED     Plan  Yearly follow-up with PCP  No more PSA check  Call if problem